# Patient Record
Sex: FEMALE | Race: WHITE | Employment: OTHER | URBAN - NONMETROPOLITAN AREA
[De-identification: names, ages, dates, MRNs, and addresses within clinical notes are randomized per-mention and may not be internally consistent; named-entity substitution may affect disease eponyms.]

---

## 2017-07-03 ENCOUNTER — HOSPITAL ENCOUNTER (OUTPATIENT)
Dept: ULTRASOUND IMAGING | Age: 52
Discharge: HOME | End: 2017-07-03
Payer: COMMERCIAL

## 2017-07-03 DIAGNOSIS — R10.32 LLQ PAIN: ICD-10-CM

## 2017-07-03 DIAGNOSIS — Z90.710 S/P HYSTERECTOMY: ICD-10-CM

## 2017-07-03 DIAGNOSIS — R10.31 RLQ ABDOMINAL PAIN: ICD-10-CM

## 2017-07-03 PROCEDURE — 76856 US EXAM PELVIC COMPLETE: CPT

## 2017-08-14 ENCOUNTER — HOSPITAL ENCOUNTER (OUTPATIENT)
Dept: WOMENS IMAGING | Age: 52
Discharge: HOME | End: 2017-08-14
Payer: COMMERCIAL

## 2017-08-14 DIAGNOSIS — Z00.00 WELLNESS EXAMINATION: ICD-10-CM

## 2017-08-14 PROCEDURE — G0202 SCR MAMMO BI INCL CAD: HCPCS

## 2017-09-18 ENCOUNTER — HOSPITAL ENCOUNTER (OUTPATIENT)
Age: 52
Discharge: HOME | End: 2017-09-18
Payer: COMMERCIAL

## 2017-09-18 DIAGNOSIS — Z00.00 WELLNESS EXAMINATION: ICD-10-CM

## 2017-09-18 LAB
CHOLESTEROL, FASTING: 176 MG/DL
CHOLESTEROL/HDL RATIO: 2.2
GLUCOSE FASTING: 101 MG/DL (ref 70–99)
HDLC SERPL-MCNC: 81 MG/DL
HEPATITIS C ANTIBODY: NONREACTIVE
LDL CHOLESTEROL: 66 MG/DL (ref 0–130)
TRIGLYCERIDE, FASTING: 147 MG/DL
VLDLC SERPL CALC-MCNC: NORMAL MG/DL (ref 1–30)

## 2017-09-18 PROCEDURE — 80061 LIPID PANEL: CPT

## 2017-09-18 PROCEDURE — 36415 COLL VENOUS BLD VENIPUNCTURE: CPT

## 2017-09-18 PROCEDURE — 86803 HEPATITIS C AB TEST: CPT

## 2017-09-18 PROCEDURE — 82947 ASSAY GLUCOSE BLOOD QUANT: CPT

## 2018-09-26 ENCOUNTER — HOSPITAL ENCOUNTER (OUTPATIENT)
Age: 53
Discharge: HOME | End: 2018-09-26
Payer: COMMERCIAL

## 2018-09-26 DIAGNOSIS — Z00.00 WELLNESS EXAMINATION: ICD-10-CM

## 2018-09-26 LAB
CHOLESTEROL/HDL RATIO: 2.3
CHOLESTEROL: 152 MG/DL
GLUCOSE FASTING: 104 MG/DL (ref 70–99)
HDLC SERPL-MCNC: 66 MG/DL
LDL CHOLESTEROL: 67 MG/DL (ref 0–130)
TRIGL SERPL-MCNC: 94 MG/DL
VLDLC SERPL CALC-MCNC: NORMAL MG/DL (ref 1–30)

## 2018-09-26 PROCEDURE — 87086 URINE CULTURE/COLONY COUNT: CPT

## 2018-09-26 PROCEDURE — 36415 COLL VENOUS BLD VENIPUNCTURE: CPT

## 2018-09-26 PROCEDURE — 82947 ASSAY GLUCOSE BLOOD QUANT: CPT

## 2018-09-26 PROCEDURE — 80061 LIPID PANEL: CPT

## 2018-09-27 LAB
CULTURE: NO GROWTH
Lab: NORMAL
SPECIMEN DESCRIPTION: NORMAL
STATUS: NORMAL

## 2018-11-13 ENCOUNTER — HOSPITAL ENCOUNTER (OUTPATIENT)
Dept: WOMENS IMAGING | Age: 53
Discharge: HOME | End: 2018-11-15
Payer: COMMERCIAL

## 2018-11-13 DIAGNOSIS — Z12.39 BREAST CANCER SCREENING: ICD-10-CM

## 2018-11-13 PROCEDURE — 77067 SCR MAMMO BI INCL CAD: CPT

## 2019-09-24 ENCOUNTER — HOSPITAL ENCOUNTER (OUTPATIENT)
Age: 54
Discharge: HOME | End: 2019-09-24
Payer: COMMERCIAL

## 2019-09-24 DIAGNOSIS — Z00.00 WELLNESS EXAMINATION: ICD-10-CM

## 2019-09-24 LAB
CHOLESTEROL/HDL RATIO: 2.1
CHOLESTEROL: 139 MG/DL
GLUCOSE FASTING: 103 MG/DL (ref 70–99)
HDLC SERPL-MCNC: 65 MG/DL
LDL CHOLESTEROL: 54 MG/DL (ref 0–130)
TRIGL SERPL-MCNC: 101 MG/DL
VLDLC SERPL CALC-MCNC: NORMAL MG/DL (ref 1–30)

## 2019-09-24 PROCEDURE — 36415 COLL VENOUS BLD VENIPUNCTURE: CPT

## 2019-09-24 PROCEDURE — 80061 LIPID PANEL: CPT

## 2019-09-24 PROCEDURE — 82947 ASSAY GLUCOSE BLOOD QUANT: CPT

## 2019-11-05 ENCOUNTER — HOSPITAL ENCOUNTER (OUTPATIENT)
Age: 54
Discharge: HOME | End: 2019-11-07
Payer: COMMERCIAL

## 2019-11-05 ENCOUNTER — HOSPITAL ENCOUNTER (OUTPATIENT)
Dept: GENERAL RADIOLOGY | Age: 54
Discharge: HOME | End: 2019-11-07
Payer: COMMERCIAL

## 2019-11-05 DIAGNOSIS — R05.9 COUGH: ICD-10-CM

## 2019-11-05 PROCEDURE — 71046 X-RAY EXAM CHEST 2 VIEWS: CPT

## 2019-12-10 ENCOUNTER — HOSPITAL ENCOUNTER (OUTPATIENT)
Dept: GENERAL RADIOLOGY | Age: 54
Discharge: HOME | End: 2019-12-12
Payer: COMMERCIAL

## 2019-12-10 ENCOUNTER — HOSPITAL ENCOUNTER (OUTPATIENT)
Age: 54
Discharge: HOME | End: 2019-12-12
Payer: COMMERCIAL

## 2019-12-10 DIAGNOSIS — J18.9 PNEUMONIA DUE TO INFECTIOUS ORGANISM, UNSPECIFIED LATERALITY, UNSPECIFIED PART OF LUNG: ICD-10-CM

## 2019-12-10 PROCEDURE — 71046 X-RAY EXAM CHEST 2 VIEWS: CPT

## 2019-12-23 ENCOUNTER — HOSPITAL ENCOUNTER (OUTPATIENT)
Dept: CT IMAGING | Age: 54
Discharge: HOME | End: 2019-12-25
Payer: COMMERCIAL

## 2019-12-23 DIAGNOSIS — J45.30 MILD PERSISTENT REACTIVE AIRWAY DISEASE WITHOUT COMPLICATION: ICD-10-CM

## 2019-12-23 DIAGNOSIS — R05.9 COUGH: ICD-10-CM

## 2019-12-23 DIAGNOSIS — J18.9 PNEUMONIA DUE TO INFECTIOUS ORGANISM, UNSPECIFIED LATERALITY, UNSPECIFIED PART OF LUNG: ICD-10-CM

## 2019-12-23 PROCEDURE — 6360000004 HC RX CONTRAST MEDICATION: Performed by: NURSE PRACTITIONER

## 2019-12-23 PROCEDURE — 71260 CT THORAX DX C+: CPT

## 2019-12-23 RX ADMIN — IOPAMIDOL 75 ML: 755 INJECTION, SOLUTION INTRAVENOUS at 08:02

## 2020-01-16 ENCOUNTER — OFFICE VISIT (OUTPATIENT)
Dept: PULMONOLOGY | Age: 55
End: 2020-01-16
Payer: COMMERCIAL

## 2020-01-16 VITALS
SYSTOLIC BLOOD PRESSURE: 111 MMHG | RESPIRATION RATE: 16 BRPM | OXYGEN SATURATION: 95 % | DIASTOLIC BLOOD PRESSURE: 75 MMHG | BODY MASS INDEX: 33.46 KG/M2 | HEART RATE: 78 BPM | HEIGHT: 64 IN | TEMPERATURE: 98.8 F | WEIGHT: 196 LBS

## 2020-01-16 PROCEDURE — 99204 OFFICE O/P NEW MOD 45 MIN: CPT | Performed by: INTERNAL MEDICINE

## 2020-01-16 ASSESSMENT — ENCOUNTER SYMPTOMS
RESPIRATORY NEGATIVE: 1
GASTROINTESTINAL NEGATIVE: 1
EYES NEGATIVE: 1
ALLERGIC/IMMUNOLOGIC NEGATIVE: 1

## 2020-01-16 NOTE — PROGRESS NOTES
Subjective:      Patient ID: Ankit Nelson is a 62 y.o. female. HPI  New patient visit, referred by Dr. Juanjose Guzmán, for evaluation of multiple pulmonary nodules. Insidious onset of acute respiratory symptoms last November. Cough, sputum production, and mild shortness of breath. Denied fever or chills. \"I thought I had a cold. \"  Treated with a course of antibiotics and ultimately steroids. A chest x-ray done on 2019 was read as mild interstitial/peribronchial thickening consistent with atypical pneumonia. Follow-up chest x-ray in a month showed no change. This prompted a CT scan of the chest done on 2019. All imaging reviewed. No clear interstitial lung disease or groundglass opacities. There were multiple tiny, subcentimeter nodules bilaterally most in the range of 3-4 mm but a 6.3 mm nodule was noted at the right lung base posteriorly. All were smoothly circumscribed, noncalcified, without malignant characteristics. No previous chest imaging is available for comparison. No lymphadenopathy or mediastinal/splenic calcifications. The patient's acute respiratory symptoms have resolved. Denies any further cough shortness of breath or congestion. Lifelong non-smoker. No occupational exposures. No history of asthma or allergies. Denies constitutional symptoms. No symptoms which suggest occult malignancy. No hematuria. Family history mother  of leukemia. Father  of complications of diabetes. [place]. No bird exposure.     Current Outpatient Medications   Medication Sig Dispense Refill    atorvastatin (LIPITOR) 40 MG tablet Take 1 tablet by mouth daily 90 tablet 1    sertraline (ZOLOFT) 100 MG tablet Take 1 tablet by mouth daily 90 tablet 1    albuterol sulfate  (90 Base) MCG/ACT inhaler Inhale 2 puffs into the lungs every 4 hours as needed for Wheezing 1 Inhaler 1    YUVAFEM 10

## 2020-01-20 ENCOUNTER — HOSPITAL ENCOUNTER (OUTPATIENT)
Dept: WOMENS IMAGING | Age: 55
Discharge: HOME | End: 2020-01-22
Payer: COMMERCIAL

## 2020-01-20 PROCEDURE — 77067 SCR MAMMO BI INCL CAD: CPT

## 2020-05-27 ENCOUNTER — HOSPITAL ENCOUNTER (OUTPATIENT)
Dept: CT IMAGING | Age: 55
Discharge: HOME | End: 2020-05-29
Payer: COMMERCIAL

## 2020-05-27 PROCEDURE — 71250 CT THORAX DX C-: CPT

## 2020-07-09 ENCOUNTER — OFFICE VISIT (OUTPATIENT)
Dept: PULMONOLOGY | Age: 55
End: 2020-07-09
Payer: COMMERCIAL

## 2020-07-09 VITALS
DIASTOLIC BLOOD PRESSURE: 79 MMHG | OXYGEN SATURATION: 97 % | HEIGHT: 64 IN | SYSTOLIC BLOOD PRESSURE: 126 MMHG | HEART RATE: 84 BPM | TEMPERATURE: 98.1 F | RESPIRATION RATE: 16 BRPM | WEIGHT: 194.3 LBS | BODY MASS INDEX: 33.17 KG/M2

## 2020-07-09 PROCEDURE — 99213 OFFICE O/P EST LOW 20 MIN: CPT | Performed by: NURSE PRACTITIONER

## 2020-07-09 ASSESSMENT — ENCOUNTER SYMPTOMS
CHEST TIGHTNESS: 0
DIARRHEA: 0
RHINORRHEA: 0
SINUS PRESSURE: 0
SHORTNESS OF BREATH: 0
CONSTIPATION: 0
STRIDOR: 0
WHEEZING: 0
NAUSEA: 0
SINUS PAIN: 0
VOMITING: 0
COUGH: 0

## 2020-07-09 NOTE — PATIENT INSTRUCTIONS
SURVEY:    You may be receiving a survey from [employer] regarding your visit today. Please complete the survey to enable us to provide the highest quality of care to you and your family. If you cannot score us a very good on any question, please call the office to discuss how we could have made your experience a very good one. Thank you. Patient Education        Learning About Lung Nodules  What is a lung nodule? A lung nodule is a growth in the lung. A single nodule surrounded by lung tissue is called a solitary pulmonary nodule. A lung nodule might not cause any symptoms. Your doctor may have found one or more nodules on your lung when you were having a chest X-ray or CT scan. Or it may have been found during a lung cancer screening. A lung nodule may be caused by an old infection or cancer. It might also be a noncancerous growth. Lung nodules can cause a screening to give an abnormal result. Most nodules do not cause any harm. But without further tests, your doctor can't tell whether an abnormal finding is cancer, a harmless nodule, or something else. What can you expect when you have a lung nodule? Your doctor will look at several risk factors to see how likely it is that the nodule is cancer. He or she will look at:  · Whether you smoke or have ever smoked. · Your age and your family's medical history. · Whether you have ever had lung cancer. · The size, density, and other characteristics of the nodule. · Whether the nodule has changed in size. Your doctor may look at past chest X-rays or CT scans, if available, and compare them. Or you may have a series of CT scans to see if the nodule grows over time. What happens next depends on the risk of the nodule being cancer. · If you have no risk factors and the nodule is small, your doctor may advise doing nothing.   · If the risk is small, your doctor may schedule follow-up appointments and CT scans later to see if the nodule is growing. · If there's a higher risk of cancer, your doctor may:  ? Do a PET scan, which may help tell if the nodule is cancerous or not. ? Take a sample of tissue from the nodule for testing. This is called a biopsy. ? Remove the nodule with surgery. Follow-up care is a key part of your treatment and safety. Be sure to make and go to all appointments, and call your doctor if you are having problems. It's also a good idea to know your test results and keep a list of the medicines you take. Where can you learn more? Go to https://tribr.[employer]. org and sign in to your [employer] account. Enter B632 in the PEER box to learn more about \"Learning About Lung Nodules. \"     If you do not have an account, please click on the \"Sign Up Now\" link. Current as of: August 22, 2019               Content Version: 12.5  © 8504-0819 Healthwise, Incorporated. Care instructions adapted under license by [facility] ([facility]). If you have questions about a medical condition or this instruction, always ask your healthcare professional. [place] [address] any warranty or liability for your use of this information.

## 2020-07-09 NOTE — PROGRESS NOTES
Subjective:      Patient ID: Maribell Paredes is a 62 y.o. female. HPI    Last saw Dr. Dalene Kussmaul in January 2020 for lung nodules. Patient states she is doing well, finally feeling back to her baseline in regards to her breathing and activity tolerance. Denies shortness of breath, cough, mucous production. Has not used Albuterol HFA at all, on no other pulmonary medications. PFT: None no chart    CT Chest 5/27/2020: Stable lung nodules, largest measuring 6mm in right lower lobe. Noncalcified right middle lobe nodule 4mm, noncalcified right middle lobe nodule medial segment measuring 4mm, calcified granuloma right middle lobe, subsolid ground glass right lower lobe measuring 3mm. Solid 6mm noncalcified right lower lobe nodule. Left lateral basal segment 4mm subpleural nodule, 3mm subpleural nodule lateral aspect of left upper lobe. CT Chest Imaging 12/23/2019: Multiple lung nodules, largest measuring 6mm in right lower lobe. Noncalcified right middle lobe nodule 4mm, noncalcified right middle lobe nodule medial segment measuring 4mm, calcified granuloma right middle lobe, subsolid ground glass right lower lobe measuring 3mm. Solid 6mm noncalcified right lower lobe nodule. Left lateral basal segment 4mm subpleural nodule, 3mm subpleural nodule lateral aspect of left upper lobe. Medications:   Albuterol HFA: Not using    Immunizations:   Immunization History   Administered Date(s) Administered    Influenza, MDCK Quadv, IM, PF (Flucelvax 4 yrs and older) 11/23/2019    Tdap (Boostrix, Adacel) 11/23/2019        No flowsheet data found.     /79 (Site: Left Upper Arm, Position: Sitting, Cuff Size: Medium Adult)   Pulse 84   Temp 98.1 °F (36.7 °C) (Tympanic)   Resp 16   Ht 5' 4\" (1.626 m)   Wt 194 lb 4.8 oz (88.1 kg)   SpO2 97%   BMI 33.35 kg/m²     Past Medical History:   Diagnosis Date    Anxiety     Depression     GERD (gastroesophageal reflux disease)     Hyperlipidemia     Vaginal atrophy Dr Carroll Jackson     Past Surgical History:   Procedure Laterality Date     SECTION      COLONOSCOPY  ,     Normal    DILATION AND CURETTAGE OF UTERUS  1986    HYSTERECTOMY, TOTAL ABDOMINAL      Still has ovaries    TONSILLECTOMY  1971     Family History   Problem Relation Age of Onset    Stroke Mother     Kidney Disease Mother     Heart Disease Mother     Diabetes Mother     High Blood Pressure Mother     Cancer Father         colon cancer / leukemia    Irritable Bowel Syndrome Father     Diabetes Sister        Social History     Socioeconomic History    Marital status:      Spouse name: Not on file    Number of children: Not on file    Years of education: Not on file    Highest education level: Not on file   Occupational History    Not on file   Social Needs    Financial resource strain: Not on file    Food insecurity     Worry: Not on file     Inability: Not on file    Transportation needs     Medical: Not on file     Non-medical: Not on file   Tobacco Use    Smoking status: Never Smoker    Smokeless tobacco: Never Used    Tobacco comment: growing up parents smoked-so had second hand smoke exposure   Substance and Sexual Activity    Alcohol use: Yes     Comment: socially     Drug use: No    Sexual activity: Not on file   Lifestyle    Physical activity     Days per week: Not on file     Minutes per session: Not on file    Stress: Not on file   Relationships    Social connections     Talks on phone: Not on file     Gets together: Not on file     Attends Mosque service: Not on file     Active member of club or organization: Not on file     Attends meetings of clubs or organizations: Not on file     Relationship status: Not on file    Intimate partner violence     Fear of current or ex partner: Not on file     Emotionally abused: Not on file     Physically abused: Not on file     Forced sexual activity: Not on file   Other Topics Concern    Not on file 196 lb (88.9 kg)   11/20/19 193 lb 6.4 oz (87.7 kg)       Results for orders placed or performed during the hospital encounter of 09/24/19   Glucose, Fasting   Result Value Ref Range    Glucose, Fasting 103 (H) 70 - 99 mg/dL   Lipid Panel   Result Value Ref Range    Cholesterol 139 <200 mg/dL    HDL 65 >40 mg/dL    LDL Cholesterol 54 0 - 130 mg/dL    Chol/HDL Ratio 2.1 <5    Triglycerides 101 <150 mg/dL    VLDL NOT REPORTED 1 - 30 mg/dL       No results found. Assessment:      1. Multiple lung nodules          Plan:      1. Recommend flu vaccination in the fall annually. 2. Patient is up-to-date with vaccinations from pulmonary perspective. 3. Maintain an active lifestyle. 4. Patient's questions were answered to her satisfaction. 5. Lifelong non smoker  6. CT scan of the chest was reviewed. Repeat imaging in 1 year. Will need 2 years radiographic evaluation from 12/2019 for stability.    7. We'll see the patient back in 12 months        Electronically signed by BENNY Do CNP on 7/9/2020 at 10:28 AM

## 2020-10-20 ENCOUNTER — HOSPITAL ENCOUNTER (OUTPATIENT)
Age: 55
Discharge: HOME | End: 2020-10-20
Payer: COMMERCIAL

## 2020-10-20 LAB
CHOLESTEROL/HDL RATIO: 2.2
CHOLESTEROL: 156 MG/DL
GLUCOSE FASTING: 109 MG/DL (ref 70–99)
HDLC SERPL-MCNC: 72 MG/DL
LDL CHOLESTEROL: 64 MG/DL (ref 0–130)
TRIGL SERPL-MCNC: 98 MG/DL
VLDLC SERPL CALC-MCNC: NORMAL MG/DL (ref 1–30)

## 2020-10-20 PROCEDURE — 82947 ASSAY GLUCOSE BLOOD QUANT: CPT

## 2020-10-20 PROCEDURE — 36415 COLL VENOUS BLD VENIPUNCTURE: CPT

## 2020-10-20 PROCEDURE — 80061 LIPID PANEL: CPT

## 2021-01-28 ENCOUNTER — HOSPITAL ENCOUNTER (OUTPATIENT)
Dept: WOMENS IMAGING | Age: 56
Discharge: HOME | End: 2021-01-30
Payer: COMMERCIAL

## 2021-01-28 DIAGNOSIS — Z12.31 BREAST CANCER SCREENING BY MAMMOGRAM: ICD-10-CM

## 2021-01-28 PROCEDURE — 77063 BREAST TOMOSYNTHESIS BI: CPT

## 2021-05-17 ENCOUNTER — HOSPITAL ENCOUNTER (OUTPATIENT)
Dept: CT IMAGING | Age: 56
Discharge: HOME | End: 2021-05-19
Payer: COMMERCIAL

## 2021-05-17 DIAGNOSIS — R91.8 MULTIPLE LUNG NODULES: ICD-10-CM

## 2021-05-17 PROCEDURE — 71250 CT THORAX DX C-: CPT

## 2021-06-03 ENCOUNTER — OFFICE VISIT (OUTPATIENT)
Dept: PULMONOLOGY | Age: 56
End: 2021-06-03
Payer: COMMERCIAL

## 2021-06-03 VITALS
WEIGHT: 182.2 LBS | HEIGHT: 64 IN | DIASTOLIC BLOOD PRESSURE: 74 MMHG | OXYGEN SATURATION: 97 % | RESPIRATION RATE: 16 BRPM | BODY MASS INDEX: 31.1 KG/M2 | TEMPERATURE: 97.1 F | SYSTOLIC BLOOD PRESSURE: 127 MMHG | HEART RATE: 79 BPM

## 2021-06-03 DIAGNOSIS — R91.8 MULTIPLE LUNG NODULES: Primary | ICD-10-CM

## 2021-06-03 PROCEDURE — 99213 OFFICE O/P EST LOW 20 MIN: CPT | Performed by: INTERNAL MEDICINE

## 2021-06-03 ASSESSMENT — ENCOUNTER SYMPTOMS
RESPIRATORY NEGATIVE: 1
EYES NEGATIVE: 1
GASTROINTESTINAL NEGATIVE: 1

## 2021-06-03 NOTE — PATIENT INSTRUCTIONS
SURVEY:    You may be receiving a survey from [employer] regarding your visit today. Please complete the survey to enable us to provide the highest quality of care to you and your family. If you cannot score us a very good on any question, please call the office to discuss how we could have made your experience a very good one. Thank you.

## 2021-06-03 NOTE — PROGRESS NOTES
Subjective:      Patient ID: Agustín Stephenson is a 60 y.o. female being seen in my clinic for   Chief Complaint   Patient presents with    Pulmonary Nodule       HPI  Follow-up visit for multiple pulmonary nodules. Since her last visit a year ago she had a repeat CT scan of the chest done on 5/17/2021. Study is reviewed and compared to previous studies done in May 2020 and January 2019. The previously noted subcentimeter pulmonary nodules are unchanged in size and configuration when compared to the previous studies. The largest nodule, 6 mm, right lower lobe unchanged as well. Patient is a low risk factor profile. Non-smoker. Based on size and follow-up CT scan, no further imaging indicated. I did advise the patient however in the future should she have another CT scan of the chest, it is likely these nodules will be present and she should alert the physicians caring for her to compare to her previous studies. Patient is completely asymptomatic. Denies cough, shortness of breath, or other respiratory symptoms. Received both of her Covid vaccinations. No new health problems. Review of Systems   Constitutional: Negative. HENT: Negative. Eyes: Negative. Respiratory: Negative. Cardiovascular: Negative. Gastrointestinal: Negative. All other systems reviewed and are negative.       Objective:     Vitals:    06/03/21 0953   BP: 127/74   Site: Left Upper Arm   Position: Sitting   Cuff Size: Medium Adult   Pulse: 79   Resp: 16   Temp: 97.1 °F (36.2 °C)   TempSrc: Temporal   SpO2: 97%   Weight: 182 lb 3.2 oz (82.6 kg)   Height: 5' 4\" (1.626 m)     Current Outpatient Medications   Medication Sig Dispense Refill    sertraline (ZOLOFT) 100 MG tablet TAKE 1 TABLET BY MOUTH  DAILY 90 tablet 3    atorvastatin (LIPITOR) 40 MG tablet TAKE 1 TABLET BY MOUTH  DAILY 90 tablet 3    YUVAFEM 10 MCG TABS vaginal tablet Place 1 tablet vaginally Twice a Week  0     No current facility-administered encounter. Return if symptoms worsen or fail to improve.        Electronically signed by Noe Aguilar DO on 6/3/2021at 10:39 AM

## 2021-10-22 ENCOUNTER — HOSPITAL ENCOUNTER (OUTPATIENT)
Age: 56
Discharge: HOME | End: 2021-10-22
Payer: COMMERCIAL

## 2021-10-22 DIAGNOSIS — Z00.00 WELLNESS EXAMINATION: ICD-10-CM

## 2021-10-22 LAB
CHOLESTEROL/HDL RATIO: 2.2
CHOLESTEROL: 161 MG/DL
GLUCOSE FASTING: 101 MG/DL (ref 70–99)
HDLC SERPL-MCNC: 74 MG/DL
LDL CHOLESTEROL: 68 MG/DL (ref 0–130)
TRIGL SERPL-MCNC: 95 MG/DL
VLDLC SERPL CALC-MCNC: NORMAL MG/DL (ref 1–30)

## 2021-10-22 PROCEDURE — 36415 COLL VENOUS BLD VENIPUNCTURE: CPT

## 2021-10-22 PROCEDURE — 82947 ASSAY GLUCOSE BLOOD QUANT: CPT

## 2021-10-22 PROCEDURE — 80061 LIPID PANEL: CPT

## 2022-02-14 ENCOUNTER — HOSPITAL ENCOUNTER (OUTPATIENT)
Dept: WOMENS IMAGING | Age: 57
Discharge: HOME | End: 2022-02-16
Payer: COMMERCIAL

## 2022-02-14 DIAGNOSIS — Z12.31 SCREENING MAMMOGRAM FOR HIGH-RISK PATIENT: ICD-10-CM

## 2022-02-14 PROCEDURE — 77063 BREAST TOMOSYNTHESIS BI: CPT

## 2022-02-14 PROCEDURE — 77067 SCR MAMMO BI INCL CAD: CPT

## 2023-04-04 PROBLEM — M72.2 PLANTAR FASCIAL FIBROMATOSIS OF LEFT FOOT: Status: ACTIVE | Noted: 2023-04-04

## 2023-05-04 ENCOUNTER — HOSPITAL ENCOUNTER (OUTPATIENT)
Dept: WOMENS IMAGING | Age: 58
Discharge: HOME | End: 2023-05-06
Payer: COMMERCIAL

## 2023-05-04 DIAGNOSIS — Z12.39 SCREENING BREAST EXAMINATION: ICD-10-CM

## 2023-05-04 PROCEDURE — 77067 SCR MAMMO BI INCL CAD: CPT

## 2023-10-27 PROBLEM — M72.2 PLANTAR FASCIAL FIBROMATOSIS OF LEFT FOOT: Status: RESOLVED | Noted: 2023-04-04 | Resolved: 2023-10-27

## 2023-11-08 ENCOUNTER — TELEPHONE (OUTPATIENT)
Dept: SURGERY | Age: 58
End: 2023-11-08

## 2024-03-20 NOTE — PROGRESS NOTES
Patient states they received their colon prep instructions and home medications that are to be taken on the day of their procedure with a small sip of water only, from the physician's office. Instructed pt to take zoloft and crestor with a small sip of water prior to arriving to the hospital the day of surgery.

## 2024-04-01 ENCOUNTER — ANESTHESIA EVENT (OUTPATIENT)
Dept: OPERATING ROOM | Age: 59
End: 2024-04-01
Payer: COMMERCIAL

## 2024-04-02 ENCOUNTER — ANESTHESIA (OUTPATIENT)
Dept: OPERATING ROOM | Age: 59
End: 2024-04-02
Payer: COMMERCIAL

## 2024-04-02 ENCOUNTER — HOSPITAL ENCOUNTER (OUTPATIENT)
Age: 59
Setting detail: OUTPATIENT SURGERY
Discharge: HOME | End: 2024-04-02
Attending: SURGERY | Admitting: SURGERY
Payer: COMMERCIAL

## 2024-04-02 VITALS
OXYGEN SATURATION: 94 % | TEMPERATURE: 96.5 F | HEART RATE: 82 BPM | HEIGHT: 64 IN | SYSTOLIC BLOOD PRESSURE: 112 MMHG | BODY MASS INDEX: 29.6 KG/M2 | WEIGHT: 173.4 LBS | RESPIRATION RATE: 18 BRPM | DIASTOLIC BLOOD PRESSURE: 72 MMHG

## 2024-04-02 DIAGNOSIS — Z12.11 SCREENING FOR COLON CANCER: ICD-10-CM

## 2024-04-02 PROBLEM — Z80.0 FAMILY HISTORY OF COLON CANCER: Status: ACTIVE | Noted: 2024-04-02

## 2024-04-02 PROCEDURE — 6360000002 HC RX W HCPCS: Performed by: NURSE ANESTHETIST, CERTIFIED REGISTERED

## 2024-04-02 PROCEDURE — 3700000000 HC ANESTHESIA ATTENDED CARE: Performed by: SURGERY

## 2024-04-02 PROCEDURE — 3700000001 HC ADD 15 MINUTES (ANESTHESIA): Performed by: SURGERY

## 2024-04-02 PROCEDURE — 3609010600 HC COLONOSCOPY POLYPECTOMY SNARE/COLD BIOPSY: Performed by: SURGERY

## 2024-04-02 PROCEDURE — 7100000010 HC PHASE II RECOVERY - FIRST 15 MIN: Performed by: SURGERY

## 2024-04-02 PROCEDURE — 7100000011 HC PHASE II RECOVERY - ADDTL 15 MIN: Performed by: SURGERY

## 2024-04-02 PROCEDURE — 2709999900 HC NON-CHARGEABLE SUPPLY: Performed by: SURGERY

## 2024-04-02 PROCEDURE — 88305 TISSUE EXAM BY PATHOLOGIST: CPT

## 2024-04-02 PROCEDURE — 2500000003 HC RX 250 WO HCPCS: Performed by: NURSE ANESTHETIST, CERTIFIED REGISTERED

## 2024-04-02 PROCEDURE — 2580000003 HC RX 258: Performed by: NURSE ANESTHETIST, CERTIFIED REGISTERED

## 2024-04-02 RX ORDER — PROPOFOL 10 MG/ML
INJECTION, EMULSION INTRAVENOUS PRN
Status: DISCONTINUED | OUTPATIENT
Start: 2024-04-02 | End: 2024-04-02 | Stop reason: SDUPTHER

## 2024-04-02 RX ORDER — FAMOTIDINE 10 MG
10 TABLET ORAL 2 TIMES DAILY
COMMUNITY

## 2024-04-02 RX ORDER — LIDOCAINE HYDROCHLORIDE 20 MG/ML
INJECTION, SOLUTION EPIDURAL; INFILTRATION; INTRACAUDAL; PERINEURAL PRN
Status: DISCONTINUED | OUTPATIENT
Start: 2024-04-02 | End: 2024-04-02 | Stop reason: SDUPTHER

## 2024-04-02 RX ORDER — SODIUM CHLORIDE, SODIUM LACTATE, POTASSIUM CHLORIDE, CALCIUM CHLORIDE 600; 310; 30; 20 MG/100ML; MG/100ML; MG/100ML; MG/100ML
INJECTION, SOLUTION INTRAVENOUS CONTINUOUS
Status: DISCONTINUED | OUTPATIENT
Start: 2024-04-02 | End: 2024-04-02 | Stop reason: HOSPADM

## 2024-04-02 RX ADMIN — PROPOFOL 150 MCG/KG/MIN: 10 INJECTION, EMULSION INTRAVENOUS at 13:05

## 2024-04-02 RX ADMIN — SODIUM CHLORIDE, POTASSIUM CHLORIDE, SODIUM LACTATE AND CALCIUM CHLORIDE: 600; 310; 30; 20 INJECTION, SOLUTION INTRAVENOUS at 11:45

## 2024-04-02 RX ADMIN — LIDOCAINE HYDROCHLORIDE 60 MG: 20 INJECTION, SOLUTION EPIDURAL; INFILTRATION; INTRACAUDAL; PERINEURAL at 13:04

## 2024-04-02 RX ADMIN — PROPOFOL 60 MG: 10 INJECTION, EMULSION INTRAVENOUS at 13:04

## 2024-04-02 ASSESSMENT — PAIN - FUNCTIONAL ASSESSMENT
PAIN_FUNCTIONAL_ASSESSMENT: NONE - DENIES PAIN

## 2024-04-02 NOTE — ANESTHESIA POSTPROCEDURE EVALUATION
Department of Anesthesiology  Postprocedure Note    Patient: Barbara Vincent  MRN: [id number]  YOB: 1965  Date of evaluation: 4/2/2024    Procedure Summary       Date: 04/02/24 Room / Location: [address]    Anesthesia Start: 1300 Anesthesia Stop: 1329    Procedure: COLONOSCOPY POLYPECTOMY SNARE/COLD BIOPSY (Abdomen) Diagnosis:       Screening for colon cancer      (Screening for colon cancer [Z12.11])    Surgeons: Tony Perrin MD Responsible Provider: Yesi Shelton APRN - CRNA    Anesthesia Type: general ASA Status: 2            Anesthesia Type: No value filed.    Aryan Phase I: Aryan Score: 10    Aryan Phase II: Aryan Score: 10    Anesthesia Post Evaluation    Patient location during evaluation: PACU  Patient participation: complete - patient participated  Level of consciousness: awake and alert  Pain score: 0  Airway patency: patent  Nausea & Vomiting: no vomiting and no nausea  Cardiovascular status: blood pressure returned to baseline  Respiratory status: acceptable  Hydration status: stable  Pain management: adequate    No notable events documented.

## 2024-04-02 NOTE — OP NOTE
Operative Note      Patient: Barbara Vincent  YOB: 1965  MRN: [id number]    Date of Procedure: 4/2/2024    Pre-Op Diagnosis Codes:     * Screening for colon cancer [Z12.11]  Possible Family History of Colon Cancer    Post-Op Diagnosis:  Possible colitis       Procedure: Colonoscopy + biopsy of cecum    Surgeon(s):  Tony Perrin MD    Assistant:   * No surgical staff found *    Anesthesia: Monitor Anesthesia Care    Estimated Blood Loss (mL): Minimal    Complications: None    Specimens:   ID Type Source Tests Collected by Time Destination   A : cecal biopsies Tissue Cecum SURGICAL PATHOLOGY Tony Perrin MD 4/2/2024 1318        Implants:  * No implants in log *      Drains: * No LDAs found *    Patient was brought to the endoscopy area and IV sedation was induced by the CRNA.  Scope was put into his rectum and passed proximally.  Her prep was pretty good.  Was a little bit of slightly pasty stool around the right colon and scattered particulate stool throughout.  This was irrigated and suctioned way without too much difficulty.  The scope was passed into the cecum and both the ileocecal valve and the appendiceal orifice were visualized.  The right colon looked pretty irritated and there was some bleeding or irritation the mucosa.  Because this possibly was cecal colitis I went ahead and did some right-sided colon biopsies.  From there the scope was slowly and carefully withdrawn using narrowband imaging on the way out and also the Endo cuff device.  No evidence of any tumors, polyps, diverticulosis, or bleeding were noted on the winter on the way out.  Retroflexed scope the rectum she has minimal hemorrhoids.  No polyps were seen    We will make recommendations based upon her biopsy results but also based upon her family history.  It is not clear whether her father really had colon cancer or not and I have asked her to find that out.  If her father had colon cancer at advanced polyp that will

## 2024-04-02 NOTE — DISCHARGE INSTRUCTIONS
1) You may have some colitis, inflammation of your colon.  Biopsies were done to see if that is the case.  2) You will be called with the biopsy results and any additional recommendations  3) Don't forget to find out why your father had part of his colon removed.   Try to get as much specific information that you can.  This will help make follow up recommendations more accurate.  4) Follow up as desired.    Discharge Instructions for Colonoscopy     Colonoscopy is a visual exam of the lining of the large intestine, also called the bowel or colon, with a colonoscope. A colonoscope is a flexible tube with a light and a viewing device. It allows the doctor to view the inside of the colon through a tiny video camera.   Colonoscopy is performed for many reasons: unexplained anemia , pain, diarrhea , bloody stools, cancer screening, among many other reasons.   Complications from a colonoscopy are rare. Some possible serious complications include perforated bowel (which might require surgery) and bleeding (which could require blood transfusion ). Minor complications include bloating, gas, and cramping that can last for 1-2 days after the procedure.   Because air is put into your colon during the procedure, it is normal to pass large amounts of air from your rectum. You may not have a bowel movement for 1-3 days after the procedure.   What You Will Need   Someone to drive you home after the procedure    Steps to Take   Home Care    Rest when you get home.    Because the sedative will make you drowsy, don't drive, operate machinery, or make important decisions the day of the procedure.      Feelings of bloating, gas, or cramping may persist for 24 hours.   Diet    Try sips of water first. If tolerated, resume regular diet or the diet recommended by your physician.    Do not drink alcohol for 24 hours.    Physical Activity    You may return to work tomorrow.    Do not drive, operate heavy machinery, or do activities that  require coordination or balance until tomorrow  Otherwise, return to your normal routine as soon as you are comfortable to do so, which is usually the next day after the procedure.    Medications    When taking medications, it's important to:   Take your medication as directednot more, not less, not at a different time.   Do not stop taking them without consulting your healthcare provider.   Don't share them with anyone else.   Know what effects and side effects to expect, and report them to your healthcare provider.   If you are taking more than one drug, even if it is an over-the-counter medication, herb, or dietary supplement, be sure to check with a physician or pharmacist about drug interactions.   Plan ahead for refills so you don't run out.     Follow-up   You will be called with your results usually within a week or  We will have you make a follow up appointment  You are always welcome to follow up in person if you have questions or there are other issue you would like addressed      Call Your Doctor If Any of the Following Occurs   Monitor your recovery once you leave the hospital. As soon as you have a problem, alert your doctor. If any of the following occur, call your doctor or come to the emergency room  Bleeding from your rectum; notify your doctor if you pass a teaspoonful or more of blood   Black, tarry stools   Severe abdominal pain   Hard, swollen abdomen   Signs of infection, including fever or chills   Inability to pass gas or stool   Coughing, shortness of breath, chest pain, severe nausea or vomiting   In case of an emergency, call 911 immediately.

## 2024-04-02 NOTE — ANESTHESIA PRE PROCEDURE
Applicable):  Lab Results   Component Value Date/Time    HEPCAB NONREACTIVE 09/18/2017 10:03 AM       COVID-19 Screening (If Applicable): No results found for: \"COVID19\"        Anesthesia Evaluation  Patient summary reviewed and Nursing notes reviewed   no history of anesthetic complications:   Airway: Mallampati: I  TM distance: >3 FB   Neck ROM: full  Mouth opening: > = 3 FB   Dental:          Pulmonary:Negative Pulmonary ROS and normal exam  breath sounds clear to auscultation                             Cardiovascular:  Exercise tolerance: good (>4 METS)  (+) hyperlipidemia        Rhythm: regular  Rate: normal           Beta Blocker:  Not on Beta Blocker         Neuro/Psych:   Negative Neuro/Psych ROS  (+) depression/anxiety             GI/Hepatic/Renal:   (+) GERD: well controlled, bowel prep          Endo/Other: Negative Endo/Other ROS                    Abdominal: normal exam            Vascular: negative vascular ROS.         Other Findings:       Anesthesia Plan      general     ASA 2       Induction: intravenous.      Anesthetic plan and risks discussed with patient.      Plan discussed with CRNA.                BENNY French - CRNA   4/2/2024

## 2024-04-02 NOTE — H&P
Name:  Barbara Vincent  Age:  58 y.o.   :  1965    Physician: SYLVIA PORTILLO MD       Chief Complaint: Family History of Colon possibly       HPI:  No symptoms at this time.      MEDICAL HISTORY:    Past Medical History:        Diagnosis Date    Anxiety     Depression     GERD (gastroesophageal reflux disease)     Hyperlipidemia     Plantar fascial fibromatosis of left foot 2023    Vaginal atrophy     Dr Lubin       Past Surgical History:        Procedure Laterality Date     SECTION      COLONOSCOPY  ,     Normal    DILATION AND CURETTAGE OF UTERUS      HYSTERECTOMY, TOTAL ABDOMINAL (CERVIX REMOVED)      Still has ovaries    TONSILLECTOMY         Prior to Admission medications    Medication Sig Start Date End Date Taking? Authorizing Provider   famotidine (PEPCID) 10 MG tablet Take 1 tablet by mouth 2 times daily   Yes Vivian Padilla MD   sertraline (ZOLOFT) 100 MG tablet Take 1 tablet by mouth daily 3/25/24   Gold Alvarez MD   rosuvastatin (CRESTOR) 20 MG tablet Take 1 tablet by mouth daily 3/12/24   Gold Alvarez MD   YUVAFEM 10 MCG TABS vaginal tablet Place 1 tablet vaginally Twice a Week 10/15/19   Provider, MD Vivian       No Known Allergies     reports that she has never smoked. She has never used smokeless tobacco.  reports current alcohol use.    Family History   Problem Relation Age of Onset    Stroke Mother     Kidney Disease Mother     Heart Disease Mother     Diabetes Mother     High Blood Pressure Mother     Cancer Father         colon cancer / leukemia    Irritable Bowel Syndrome Father     Diabetes Sister             REVIEW OF SYSTEMS:  General:  negative  Eye:  negative   ENT:negative  Allergy/Immunology:  negative  Hematology/Lymphatic: negative  Lungs: negative  Cardiovascular: negative  Gastrointestinal: had some heartburn last night.  : negative  Neurological: negative      PHYSICAL EXAM:    BP (!) 136/93   Pulse 93

## 2024-04-03 ENCOUNTER — TELEPHONE (OUTPATIENT)
Dept: SURGERY | Age: 59
End: 2024-04-03

## 2024-04-05 LAB — SURGICAL PATHOLOGY REPORT: NORMAL

## 2024-04-15 ENCOUNTER — TELEPHONE (OUTPATIENT)
Dept: SURGERY | Age: 59
End: 2024-04-15

## 2024-04-15 NOTE — TELEPHONE ENCOUNTER
----- Message from Tony Perrin MD sent at 4/5/2024 10:09 AM EDT -----  Biopsy was to look for colitis. No colitis seen on biopsy.  Follow up prn. In the absence of any new symptoms or change in family history patient should consider a repeat colonoscopy in 10 years for colon cancer screening.

## 2024-05-02 PROBLEM — Z12.11 COLON CANCER SCREENING: Status: RESOLVED | Noted: 2024-04-02 | Resolved: 2024-05-02

## 2024-06-06 ENCOUNTER — HOSPITAL ENCOUNTER (OUTPATIENT)
Dept: WOMENS IMAGING | Age: 59
Discharge: HOME | End: 2024-06-08
Payer: COMMERCIAL

## 2024-06-06 DIAGNOSIS — Z12.31 ENCOUNTER FOR SCREENING MAMMOGRAM FOR MALIGNANT NEOPLASM OF BREAST: ICD-10-CM

## 2024-06-06 PROCEDURE — 77063 BREAST TOMOSYNTHESIS BI: CPT

## (undated) DEVICE — SOLUTION IRRIG 1000ML 0.9% SOD CHL USP POUR PLAS BTL

## (undated) DEVICE — CANNULA ORAL NSL AD CO2 N INTUB O2 DEL DISP TRU LNK

## (undated) DEVICE — TUBING SUCT NON-STRL 9/32X100 W/CNNT

## (undated) DEVICE — FORCEPS BX L240CM JAW DIA2.8MM L CAP W/ NDL MIC MESH TOOTH